# Patient Record
Sex: MALE | Race: BLACK OR AFRICAN AMERICAN | NOT HISPANIC OR LATINO | URBAN - METROPOLITAN AREA
[De-identification: names, ages, dates, MRNs, and addresses within clinical notes are randomized per-mention and may not be internally consistent; named-entity substitution may affect disease eponyms.]

---

## 2022-03-31 ENCOUNTER — EMERGENCY (EMERGENCY)
Facility: HOSPITAL | Age: 52
LOS: 1 days | Discharge: HOME | End: 2022-03-31
Attending: EMERGENCY MEDICINE | Admitting: EMERGENCY MEDICINE
Payer: COMMERCIAL

## 2022-03-31 VITALS
HEIGHT: 68 IN | SYSTOLIC BLOOD PRESSURE: 139 MMHG | HEART RATE: 82 BPM | OXYGEN SATURATION: 99 % | WEIGHT: 139.99 LBS | TEMPERATURE: 98 F | RESPIRATION RATE: 18 BRPM | DIASTOLIC BLOOD PRESSURE: 85 MMHG

## 2022-03-31 DIAGNOSIS — Z20.822 CONTACT WITH AND (SUSPECTED) EXPOSURE TO COVID-19: ICD-10-CM

## 2022-03-31 DIAGNOSIS — N50.812 LEFT TESTICULAR PAIN: ICD-10-CM

## 2022-03-31 DIAGNOSIS — F17.210 NICOTINE DEPENDENCE, CIGARETTES, UNCOMPLICATED: ICD-10-CM

## 2022-03-31 DIAGNOSIS — Z91.018 ALLERGY TO OTHER FOODS: ICD-10-CM

## 2022-03-31 LAB — SARS-COV-2 RNA SPEC QL NAA+PROBE: NEGATIVE — SIGNIFICANT CHANGE UP

## 2022-03-31 PROCEDURE — 87635 SARS-COV-2 COVID-19 AMP PRB: CPT

## 2022-03-31 PROCEDURE — 76870 US EXAM SCROTUM: CPT | Mod: 26

## 2022-03-31 PROCEDURE — 99284 EMERGENCY DEPT VISIT MOD MDM: CPT

## 2022-03-31 PROCEDURE — 76870 US EXAM SCROTUM: CPT

## 2022-03-31 PROCEDURE — 99284 EMERGENCY DEPT VISIT MOD MDM: CPT | Mod: 25

## 2022-03-31 RX ORDER — OXYCODONE AND ACETAMINOPHEN 5; 325 MG/1; MG/1
1 TABLET ORAL ONCE
Refills: 0 | Status: DISCONTINUED | OUTPATIENT
Start: 2022-03-31 | End: 2022-03-31

## 2022-03-31 RX ADMIN — OXYCODONE AND ACETAMINOPHEN 1 TABLET(S): 5; 325 TABLET ORAL at 19:02

## 2022-03-31 NOTE — ED PROVIDER NOTE - CARE PROVIDER_API CALL
Maribel Fitch)  Female Pelvic MedReconst Surg; Urology  [address] [place] [id number]  Phone: [phone]  Fax: [phone]  Follow Up Time: 4-6 Days    Tomy Smith  Urology  [address] [place] [address]  Phone: [phone]  Fax: [phone]  Follow Up Time: 4-6 Days

## 2022-03-31 NOTE — ED PROVIDER NOTE - NSFOLLOWUPINSTRUCTIONS_ED_ALL_ED_FT
Testicle Pain    WHAT YOU NEED TO KNOW:    Testicle pain may start in your scrotum and spread to your abdomen. You may have sharp, sudden pain or dull pain that happens over time. Your testicle pain may come and go, or it may last for a long time. The cause of your pain may be unknown. Testicle pain can be caused by infection, trauma, hernia, kidney stones, or sexually transmitted infections (STIs). You may have a painful lump in your scrotum. The lump may be caused by an enlarged vein or fluid that collects around one of your testicles. This lump also may be caused by a more serious medical condition. Part of your testicle may twist. This is a serious condition that needs treatment as soon as possible.    DISCHARGE INSTRUCTIONS:    Medicines:   •Antibiotics: This medicine helps fight or prevent infection. Take your antibiotics until they are gone, even if you feel better.      •Pain medicine: You may be given a prescription medicine to decrease pain. Do not wait until the pain is severe before you take this medicine.      •NSAIDs: These medicines decrease swelling, pain, and fever. NSAIDs are available without a doctor's order. Ask your healthcare provider which medicine is right for you. Ask how much to take and when to take it. Take as directed. NSAIDs can cause stomach bleeding and kidney problems if not taken correctly.      •Take your medicine as directed. Contact your healthcare provider if you think your medicine is not helping or if you have side effects. Tell him or her if you are allergic to any medicine. Keep a list of the medicines, vitamins, and herbs you take. Include the amounts, and when and why you take them. Bring the list or the pill bottles to follow-up visits. Carry your medicine list with you in case of an emergency.      Decrease discomfort: With treatment, your pain may improve within 1 to 3 days. Depending on the cause of your testicle pain, your condition may take up to 4 weeks to heal.   •Rest: Limit your activity until your pain decreases. Get more rest while you heal. Do not sit for long periods of time.       •Cold packs: Place cold packs on your testicles to help ease your pain. Use cold packs as directed.      •Elevation: Gently tuck a folded towel under your testicles to lift them as you sit in a chair or lie in bed. This will help ease your pain and decrease swelling.      Follow up with your healthcare provider or urologist in 3 to 7 days: Write down your questions so you remember to ask them during your visits.    Sexual activity: Avoid sexual activity until you have finished your antibiotics or until your healthcare provider tells you it is safe to have sex. Use condoms to lower your risk of STIs.    Contact your healthcare provider or urologist if:   •You feel that your medicine or treatment is not working.      •You feel more pain, tenderness, or swelling than before.      •You have nausea or a low fever.      •You have questions or concerns about your condition or care.      Return to the emergency department if:   •You have sudden or severe pain in your testicles or abdomen.      •You have pain in both testicles.      •You are vomiting.      •You have a high fever.      •Your pain increases when you elevate your testicles.      •Your scrotum turns blue. This could mean your testicle is not getting the blood flow it needs.         © Copyright [employer] 2022           back to top                          © Copyright [employer] 2022

## 2022-03-31 NOTE — ED PROVIDER NOTE - CLINICAL SUMMARY MEDICAL DECISION MAKING FREE TEXT BOX
50 yo male healthy with left testicle pain int x a few days, but now increased in past 3.5 hours  tender on exam  will get sono ro torsion  give pain meds 52 yo male healthy with left testicle pain int x a few days, but now increased in past 3.5 hours  tender on exam  will get sono ro torsion  give pain meds  sono normal blood flow, no torsion  left varicocele  [facility] urology

## 2022-03-31 NOTE — ED PROVIDER NOTE - OBJECTIVE STATEMENT
int testicular pain on left past few days, then around 3:30pm today the pain became intense and radiated up to abd  no nv  no urinary symptoms  no penile discharge  not sexually active for past 2 years

## 2022-03-31 NOTE — ED PROVIDER NOTE - CARE PROVIDERS DIRECT ADDRESSES
,[email or web address]

## 2022-03-31 NOTE — ED ADULT NURSE NOTE - OBJECTIVE STATEMENT
51years male alert mental state (AOX3) received on foot.  -Allergy: tomato.  -complain of Lt testicular pain swelling since last night.  pain is not relieved when support his testicle.  -denied n/v/d, SOB, chest pain.  Pt is in the bed comfortably at this time. Will continue to monitor and document any changes.

## 2022-03-31 NOTE — ED PROVIDER NOTE - PROVIDER TOKENS
PROVIDER:[TOKEN:[[id number]],FOLLOWUP:[4-6 Days]],PROVIDER:[TOKEN:[[id number]],FOLLOWUP:[4-6 Days]]

## 2022-03-31 NOTE — ED PROVIDER NOTE - ATTESTATION, MLM
6 I have reviewed and confirmed nurses' notes for patient's medications, allergies, medical history, and surgical history.

## 2022-03-31 NOTE — ED PROVIDER NOTE - PATIENT PORTAL LINK FT
You can access the FollowMyHealth Patient Portal offered by [facility] by registering at the following website: [email or web address]. By joining [facility]’s FollowMyHealth portal, you will also be able to view your health information using other applications (apps) compatible with our system.

## 2022-03-31 NOTE — ED ADULT NURSE NOTE - NSIMPLEMENTINTERV_GEN_ALL_ED
Implemented All Universal Safety Interventions:  [place] to call system. Call bell, personal items and telephone within reach. Instruct patient to call for assistance. Room bathroom lighting operational. Non-slip footwear when patient is off stretcher. Physically safe environment: no spills, clutter or unnecessary equipment. Stretcher in lowest position, wheels locked, appropriate side rails in place.

## 2022-04-07 ENCOUNTER — NON-APPOINTMENT (OUTPATIENT)
Age: 52
End: 2022-04-07

## 2022-04-07 ENCOUNTER — APPOINTMENT (OUTPATIENT)
Dept: UROLOGY | Facility: CLINIC | Age: 52
End: 2022-04-07
Payer: MEDICAID

## 2022-04-07 VITALS
WEIGHT: 140 LBS | DIASTOLIC BLOOD PRESSURE: 78 MMHG | BODY MASS INDEX: 21.22 KG/M2 | TEMPERATURE: 98.2 F | SYSTOLIC BLOOD PRESSURE: 133 MMHG | HEART RATE: 58 BPM | HEIGHT: 68 IN

## 2022-04-07 DIAGNOSIS — Z00.00 ENCOUNTER FOR GENERAL ADULT MEDICAL EXAMINATION W/OUT ABNORMAL FINDINGS: ICD-10-CM

## 2022-04-07 DIAGNOSIS — R10.2 PELVIC AND PERINEAL PAIN: ICD-10-CM

## 2022-04-07 PROCEDURE — 99203 OFFICE O/P NEW LOW 30 MIN: CPT

## 2022-04-07 NOTE — HISTORY OF PRESENT ILLNESS
[Currently Experiencing ___] :  [unfilled] [None] : None [FreeTextEntry1] : 51 yr old male with no PMH presents today to establish care\par recently seen in [facility] March 31, 2020 with complains of testicular pain.\par Reports pain onset is sudden with no exacerbating activity \par "felt like a stomach ache before sever testicular pain"\par Pain on left pelvic area radiating down on perineal area\par Denies urinary problems\par Denies nausea and vomiting\par Denies dysuria, hematuria, and flank pain \par \par Social Hx: occasional cigarette, marijuana 3-4X per week. Social Alcohol \par                   works as a book keeper\par \par FHx: Father - bone cancer\par         Mother - breast cancer \par \par US scrotal march 31, 2022\par No evidence of testicular torsion.\par Borderline left varicocele.\par

## 2022-04-07 NOTE — ASSESSMENT
[FreeTextEntry1] : Pelvic Pain\par Discussed causes and treatment options\par Advised warm tub baths 15 - 20 mins daily\par NSAIDs for discomfort\par Pelvic floor exercise\par Informed of Alpha blocker\par if pain or discomfort persists, will consider pelvic floor physical therapy. \par \par US scrotal march 31, 2022: No evidence of testicular torsion.\par Borderline left varicocele.\par \par Labs: results over phone \par UC, UA, PSA \par \par Follow up in 2 months\par \par

## 2022-04-07 NOTE — PHYSICAL EXAM
[General Appearance - Well Developed] : well developed [General Appearance - Well Nourished] : well nourished [Normal Appearance] : normal appearance [Well Groomed] : well groomed [General Appearance - In No Acute Distress] : no acute distress [Edema] : no peripheral edema [Respiration, Rhythm And Depth] : normal respiratory rhythm and effort [Exaggerated Use Of Accessory Muscles For Inspiration] : no accessory muscle use [Abdomen Soft] : soft [Abdomen Tenderness] : non-tender [Costovertebral Angle Tenderness] : no ~M costovertebral angle tenderness [Urethral Meatus] : meatus normal [Urinary Bladder Findings] : the bladder was normal on palpation [Scrotum] : the scrotum was normal [Testes Mass (___cm)] : there were no testicular masses [Normal Station and Gait] : the gait and station were normal for the patient's age [] : no rash [No Focal Deficits] : no focal deficits [Oriented To Time, Place, And Person] : oriented to person, place, and time [Affect] : the affect was normal [Mood] : the mood was normal [Not Anxious] : not anxious [No Palpable Adenopathy] : no palpable adenopathy [Penis Abnormality] : normal circumcised penis [FreeTextEntry1] : bilateral 6cc testis, left varoccele

## 2022-04-08 LAB
APPEARANCE: CLEAR
BACTERIA: NEGATIVE
BILIRUBIN URINE: NEGATIVE
BLOOD URINE: ABNORMAL
COLOR: YELLOW
GLUCOSE QUALITATIVE U: NEGATIVE
HYALINE CASTS: 2 /LPF
KETONES URINE: NEGATIVE
LEUKOCYTE ESTERASE URINE: NEGATIVE
MICROSCOPIC-UA: NORMAL
NITRITE URINE: NEGATIVE
PH URINE: 6
PROTEIN URINE: NORMAL
PSA FREE FLD-MCNC: 16 %
PSA FREE SERPL-MCNC: 0.16 NG/ML
PSA SERPL-MCNC: 0.95 NG/ML
RED BLOOD CELLS URINE: 9 /HPF
SPECIFIC GRAVITY URINE: 1.03
SQUAMOUS EPITHELIAL CELLS: 1 /HPF
UROBILINOGEN URINE: NORMAL
WHITE BLOOD CELLS URINE: 1 /HPF

## 2022-04-11 ENCOUNTER — NON-APPOINTMENT (OUTPATIENT)
Age: 52
End: 2022-04-11

## 2022-04-11 LAB — BACTERIA UR CULT: NORMAL

## 2022-04-13 ENCOUNTER — NON-APPOINTMENT (OUTPATIENT)
Age: 52
End: 2022-04-13

## 2022-04-13 DIAGNOSIS — R31.29 OTHER MICROSCOPIC HEMATURIA: ICD-10-CM

## 2022-05-06 ENCOUNTER — APPOINTMENT (OUTPATIENT)
Dept: UROLOGY | Facility: CLINIC | Age: 52
End: 2022-05-06
